# Patient Record
Sex: MALE | Race: WHITE
[De-identification: names, ages, dates, MRNs, and addresses within clinical notes are randomized per-mention and may not be internally consistent; named-entity substitution may affect disease eponyms.]

---

## 2018-03-12 ENCOUNTER — HOSPITAL ENCOUNTER (OUTPATIENT)
Dept: HOSPITAL 11 - JP.SDS | Age: 38
Discharge: HOME | End: 2018-03-12
Attending: SURGERY
Payer: COMMERCIAL

## 2018-03-12 DIAGNOSIS — E78.5: ICD-10-CM

## 2018-03-12 DIAGNOSIS — L90.5: Primary | ICD-10-CM

## 2018-03-12 DIAGNOSIS — K21.9: ICD-10-CM

## 2018-03-12 DIAGNOSIS — I10: ICD-10-CM

## 2018-03-12 DIAGNOSIS — E11.9: ICD-10-CM

## 2018-03-12 PROCEDURE — 88305 TISSUE EXAM BY PATHOLOGIST: CPT

## 2018-03-12 PROCEDURE — 22903 EXC ABD LES SC 3 CM/>: CPT

## 2018-03-13 NOTE — OR
DATE OF PROCEDURE:  03/12/2018

 

PREOPERATIVE DIAGNOSIS:  3.5 cm abdominal wall mass, right side.

 

POSTOPERATIVE DIAGNOSIS:  3.5 cm abdominal wall mass, right side.

 

PROCEDURE:  Excision of 3.5 cm abdominal wall mass, right side, through a 7-cm 
long

elliptical incision.

 

SURGEON:  Phong Hughes MD

 

ANESTHESIA:  IV anesthesia with monitored anesthesia.

 

INDICATION:  This 37-year-old white male has a lesion on the right side of his 
abdomen.  He

said it began something like a pimple 4 years ago and it has slowly gotten 
worse.  He was

referred to have it excised.  It measures 3.5 cm in the longest dimension x 
about 3 cm in

the transverse dimension.  I counseled him for excision of this, including 
risks and

alternatives, and he gave his informed consent to proceed.

 

DESCRIPTION OF PROCEDURE:  After adequate IV anesthesia was obtained, the 
patient's abdomen

was prepped and draped in the usual sterile fashion.  Lidocaine 1% with 
epinephrine in a

50:50 mix with 0.5% Marcaine was infiltrated about the lesion.  The lesion was 
then excised

using an elliptical incision with a 7-cm long ellipse, went all the way to the 
fascia using

Bovie cautery and excised it.  The medial aspect was tagged with a stitch to 
la it.  The

incision was irrigated and suctioned dry.  The deep tissues were closed with 
interrupted 3-0

and 2-0 Vicryl suture, 4-0 Vicryl using a subcuticular stitch was placed to 
approximate the

skin.  Dermabond was applied.  He tolerated the procedure well and was brought 
to the

recovery room in a good condition.

 

 

 

 

Phong Hughes MD

DD:  03/12/2018 11:00:51

DT:  03/12/2018 14:05:37

Job #:  [id number]

MTDD

## 2021-09-20 NOTE — EDM.PDOC
ED HPI GENERAL MEDICAL PROBLEM





- General


Chief Complaint: Chest Pain


Stated Complaint: CHEST PAIN


Time Seen by Provider: 09/20/21 15:21


Source of Information: Reports: Patient, RN Notes Reviewed


History Limitations: Reports: No Limitations





- History of Present Illness


INITIAL COMMENTS - FREE TEXT/NARRATIVE: 





Joseluis presents today with complaints of chest pressure for the past 24 hours. He

states he was walking a trail in [facility] with his wife yesterday, felt 

sudden onset of chest pressure, had to stop walking, took a rest for a few 

minutes then resumed trail.  He states the chest pressure went away but then 

came back and will not go away.  He denies radiation of the chest pressure to 

anywhere else on his body. He denies fever, chills, nausea, vomiting, change in 

bowel/bladder or other concerns.  He states he has not taken cholesterol, asa or

diabetes medicine for about 5 years.  





Primary provider Ze Bull NP. 





Patient reports use of marijuana.  He denies use of tobacco, alcohol or any 

other illicit drugs. 





Significant family history of CAD, CHF, MI.


  ** Bilateral Chest


Pain Score (Numeric/FACES): 3





- Related Data


                                    Allergies











Allergy/AdvReac Type Severity Reaction Status Date / Time


 


No Known Allergies Allergy   Verified 09/20/21 14:54











Home Meds: 


                                    Home Meds





Lisinopril 20 mg PO DAILY 03/08/18 [History]











Past Medical History


HEENT History: Reports: Impaired Vision


Cardiovascular History: Reports: Heart Murmur, Hypertension


Respiratory History: Reports: Sleep Apnea


Psychiatric History: Reports: Anxiety


Endocrine/Metabolic History: Reports: Diabetes, Type II, Obesity/BMI 30+





- Infectious Disease History


Infectious Disease History: Reports: Chicken Pox





- Past Surgical History


Head Surgeries/Procedures: Reports: None


HEENT Surgical History: Reports: None


Cardiovascular Surgical History: Reports: None


Respiratory Surgical History: Reports: None


Endocrine Surgical History: Reports: None


Dermatological Surgical History: Reports: None





Social & Family History





- Family History


Family Medical History: No Pertinent Family History





- Tobacco Use


Tobacco Use Status *Q: Never Tobacco User





- Caffeine Use


Caffeine Use: Reports: Coffee





- Recreational Drug Use


Recreational Drug Use: No





ED ROS GENERAL





- Review of Systems


Review Of Systems: See Below


Constitutional: Reports: No Symptoms


HEENT: Reports: No Symptoms


Respiratory: Reports: No Symptoms


Cardiovascular: Reports: Chest Pain (chest pressure like something is sitting on

 chest since 1300 yesterday. ), Blood Pressure Problem.  Denies: Claudication, 

Dyspnea on Exertion, Edema, Lightheadedness, Orthopnea, Palpitations, PND, 

Syncope


Endocrine: Reports: Fatigue (for the past 24 hours)


GI/Abdominal: Reports: No Symptoms


: Reports: No Symptoms


Musculoskeletal: Reports: No Symptoms


Skin: Reports: No Symptoms


Neurological: Reports: No Symptoms


Psychiatric: Reports: No Symptoms


Hematologic/Lymphatic: Reports: No Symptoms


Immunologic: Reports: No Symptoms





ED EXAM, GENERAL





- Physical Exam


Exam: See Below


Exam Limited By: No Limitations


General Appearance: Alert, WD/WN, No Apparent Distress


Eye Exam: Bilateral Eye: Normal Inspection, PERRL


Ears: Normal External Exam, Normal Canal, Hearing Grossly Normal, Normal TMs


Throat/Mouth: Normal Inspection, Normal Lips, Normal Teeth, Normal Gums, Normal 

Oropharynx, Normal Voice, No Airway Compromise


Head: Atraumatic, Normocephalic


Neck: Normal Inspection, Supple, Non-Tender, Full Range of Motion.  No: 

Lymphadenopathy (R), Lymphadenopathy (L)


Respiratory/Chest: No Respiratory Distress, Lungs Clear, Normal Breath Sounds, 

No Accessory Muscle Use, Chest Non-Tender.  No: Decreased Breath Sounds, 

Crackles, Rales, Rhonchi, Wheezing, Stridor, Retractions, Splinting


Cardiovascular: Normal Peripheral Pulses, Regular Rate, Rhythm, No Edema, No 

Gallop, No Murmur, No Rub


Peripheral Pulses: 4+: Radial (L), Radial (R), Dorsalis Pedis (L), Dorsalis 

Pedis (R)


GI/Abdominal: Normal Bowel Sounds, Soft, Non-Tender, No Organomegaly, No 

Distention, No Abnormal Bruit, No Mass, Pelvis Stable.  No: Guarding, Rigid, 

Rebound, Tender


 (Male) Exam: Deferred


Rectal (Males) Exam: Deferred


Back Exam: Normal Inspection, Full Range of Motion.  No: CVA Tenderness (R), CVA

 Tenderness (L)


Extremities: Normal Inspection, Normal Range of Motion, Non-Tender, No Pedal 

Edema, Normal Capillary Refill


Neurological: Alert, Oriented, CN II-XII Intact, Normal Cognition, Normal 

Reflexes, No Motor/Sensory Deficits


Psychiatric: Normal Affect, Normal Mood


Skin Exam: Warm, Dry, Intact, Normal Color, No Rash


Lymphatic: No Adenopathy


  ** #1 Interpretation


EKG Date: 09/20/21


Time: 15:24


Rhythm: NSR


Rate (Beats/Min): 75


Axis: Normal


P-Wave: Present (Flipped T waves in lead III)


QRS: Normal


ST-T: Normal


QT: Normal


WI/PQ Interval: 161


Comparison: NA - No Prior EKG





  ** #2 Interpretation


EKG Date: 09/20/21


Time: 19:58


Rhythm: NSR


Axis: Normal


P-Wave: Present


QRS: Normal


ST-T: Normal


QT: Normal


Comparison: No Change (flipped T waves to lead III)





Course





- Vital Signs


Last Recorded V/S: 


                                Last Vital Signs











Temp  36.2 C   09/20/21 14:52


 


Pulse  76   09/20/21 22:15


 


Resp  18   09/20/21 22:15


 


BP  153/86 H  09/20/21 22:15


 


Pulse Ox  96   09/20/21 22:15














- Orders/Labs/Meds


Orders: 


                               Active Orders 24 hr











 Category Date Time Status


 


 Telemetry Monitoring [Cardiac Monitoring] [RC] .As Care  09/20/21 19:03 Active





 Directed   


 


 Chest 1V Frontal [CR] Stat Exams  09/20/21 15:39 Taken


 


 Heparin Sodium/D5W [Heparin 25,000 Units in D5W 500 ML] Med  09/20/21 18:45 

Active





 25,000 units in 500 ml IV TITRATE   


 


 Sodium Chloride 0.9% [Normal Saline] 1,000 ml Med  09/20/21 16:30 Active





 IV ASDIRECTED   


 


 Sodium Chloride 0.9% [Saline Flush] Med  09/20/21 15:40 Active





 10 ml FLUSH ASDIRECTED PRN   


 


 Saline Lock Insert [OM.PC] Routine Oth  09/20/21 15:40 Ordered


 


 EKG 12 Lead [EK] Routine Ther  09/20/21 15:21 Ordered


 


 EKG 12 Lead [EK] Routine Ther  09/20/21 19:04 Ordered








                                Medication Orders





Sodium Chloride (Normal Saline)  1,000 mls @ 50 mls/hr IV ASDIRECTED MARCELINO


   Last Admin: 09/20/21 16:41  Dose: 50 mls/hr


   Documented by: [id number]


Heparin Sodium/Dextrose (Heparin 25,000 Units In D5w 500 Ml)  25,000 units in 

500 mls @ 29.538 mls/hr IV TITRATE MARCELINO; Protocol


   Last Admin: 09/20/21 19:39  Dose: 8.13 units/kg/hr, 20 mls/hr


   Documented by: [email or web address]   Cosigned by: DANAY


Sodium Chloride (Sodium Chloride 0.9% 10 Ml Syringe)  10 ml FLUSH ASDIRECTED PRN


   PRN Reason: Keep Vein Open


   Last Admin: 09/20/21 16:39  Dose: 10 ml


   Documented by: [id number]








Labs: 


                                Laboratory Tests











  09/20/21 09/20/21 09/20/21 Range/Units





  15:52 15:52 16:34 


 


WBC  10.9    (4.5-11.0)  K/uL


 


RBC  4.70    (4.30-5.90)  M/uL


 


Hgb  14.4    (12.0-15.0)  g/dL


 


Hct  41.1    (40.0-54.0)  %


 


MCV  87    (80-98)  fL


 


MCH  31    (27-31)  pg


 


MCHC  35    (32-36)  %


 


Plt Count  227    (150-400)  K/uL


 


Neut % (Auto)  64.3    (36-66)  %


 


Lymph % (Auto)  28.1    (24-44)  %


 


Mono % (Auto)  6.4 H    (2-6)  %


 


Eos % (Auto)  0.7 L    (2-4)  %


 


Baso % (Auto)  0.5    (0-1)  %


 


Sodium   135 L   (140-148)  mmol/L


 


Potassium   3.7   (3.6-5.2)  mmol/L


 


Chloride   101   (100-108)  mmol/L


 


Carbon Dioxide   26   (21-32)  mmol/L


 


Anion Gap   11.7   (5.0-14.0)  mmol/L


 


BUN   12   (7-18)  mg/dL


 


Creatinine   1.0   (0.8-1.3)  mg/dL


 


Est Cr Clr Drug Dosing   105.62   mL/min


 


Estimated GFR (MDRD)   > 60   (>60)  


 


Glucose   163 H   ()  mg/dL


 


Calcium   8.7   (8.5-10.1)  mg/dL


 


Total Bilirubin   0.3   (0.2-1.0)  mg/dL


 


AST   14 L   (15-37)  U/L


 


ALT   24   (12-78)  U/L


 


Alkaline Phosphatase   59   ()  U/L


 


Troponin I   0.224 H*   (0.000-0.056)  ng/mL


 


NT-Pro-B Natriuret Pep    153 H  (5-125)  pg/mL


 


Total Protein   6.4   (6.4-8.2)  g/dL


 


Albumin   3.5   (3.4-5.0)  g/dL


 


Globulin   2.9   (2.3-3.5)  g/dL


 


Albumin/Globulin Ratio   1.2   (1.2-2.2)  


 


TSH, Ultra Sensitive   4.310 H   (0.358-3.740)  uIU/mL


 


Urine Color     (YELLOW)  


 


Urine Appearance     (CLEAR)  


 


Urine pH     (5.0-8.0)  


 


Ur Specific Gravity     (1.008-1.030)  


 


Urine Protein     (NEGATIVE)  mg/dL


 


Urine Glucose (UA)     (NEGATIVE)  mg/dL


 


Urine Ketones     (NEGATIVE)  mg/dL


 


Urine Occult Blood     (NEGATIVE)  


 


Urine Nitrite     (NEGATIVE)  


 


Urine Bilirubin     (NEGATIVE)  


 


Urine Urobilinogen     (0.2-1.0)  EU/dL


 


Ur Leukocyte Esterase     (NEGATIVE)  


 


Urine RBC     (0-5)  


 


Urine WBC     (0-5)  


 


Ur Epithelial Cells     


 


Amorphous Sediment     


 


Urine Bacteria     


 


Urine Mucus     


 


SARS CoV-2 RNA Rapid CYNDI     














  09/20/21 09/20/21 09/20/21 Range/Units





  16:36 17:20 17:52 


 


WBC     (4.5-11.0)  K/uL


 


RBC     (4.30-5.90)  M/uL


 


Hgb     (12.0-15.0)  g/dL


 


Hct     (40.0-54.0)  %


 


MCV     (80-98)  fL


 


MCH     (27-31)  pg


 


MCHC     (32-36)  %


 


Plt Count     (150-400)  K/uL


 


Neut % (Auto)     (36-66)  %


 


Lymph % (Auto)     (24-44)  %


 


Mono % (Auto)     (2-6)  %


 


Eos % (Auto)     (2-4)  %


 


Baso % (Auto)     (0-1)  %


 


Sodium     (140-148)  mmol/L


 


Potassium     (3.6-5.2)  mmol/L


 


Chloride     (100-108)  mmol/L


 


Carbon Dioxide     (21-32)  mmol/L


 


Anion Gap     (5.0-14.0)  mmol/L


 


BUN     (7-18)  mg/dL


 


Creatinine     (0.8-1.3)  mg/dL


 


Est Cr Clr Drug Dosing     mL/min


 


Estimated GFR (MDRD)     (>60)  


 


Glucose     ()  mg/dL


 


Calcium     (8.5-10.1)  mg/dL


 


Total Bilirubin     (0.2-1.0)  mg/dL


 


AST     (15-37)  U/L


 


ALT     (12-78)  U/L


 


Alkaline Phosphatase     ()  U/L


 


Troponin I    0.814 H*  (0.000-0.056)  ng/mL


 


NT-Pro-B Natriuret Pep     (5-125)  pg/mL


 


Total Protein     (6.4-8.2)  g/dL


 


Albumin     (3.4-5.0)  g/dL


 


Globulin     (2.3-3.5)  g/dL


 


Albumin/Globulin Ratio     (1.2-2.2)  


 


TSH, Ultra Sensitive     (0.358-3.740)  uIU/mL


 


Urine Color  Yellow    (YELLOW)  


 


Urine Appearance  Clear    (CLEAR)  


 


Urine pH  6.0    (5.0-8.0)  


 


Ur Specific Gravity  > 1.030    (1.008-1.030)  


 


Urine Protein  >=300 H    (NEGATIVE)  mg/dL


 


Urine Glucose (UA)  100 H    (NEGATIVE)  mg/dL


 


Urine Ketones  Negative    (NEGATIVE)  mg/dL


 


Urine Occult Blood  Negative    (NEGATIVE)  


 


Urine Nitrite  Negative    (NEGATIVE)  


 


Urine Bilirubin  Negative    (NEGATIVE)  


 


Urine Urobilinogen  0.2    (0.2-1.0)  EU/dL


 


Ur Leukocyte Esterase  Negative    (NEGATIVE)  


 


Urine RBC  0-5    (0-5)  


 


Urine WBC  Not seen    (0-5)  


 


Ur Epithelial Cells  Not seen    


 


Amorphous Sediment  Not seen    


 


Urine Bacteria  Rare    


 


Urine Mucus  Not seen    


 


SARS CoV-2 RNA Rapid CYNDI   Negative   














  09/20/21 Range/Units





  21:55 


 


WBC   (4.5-11.0)  K/uL


 


RBC   (4.30-5.90)  M/uL


 


Hgb   (12.0-15.0)  g/dL


 


Hct   (40.0-54.0)  %


 


MCV   (80-98)  fL


 


MCH   (27-31)  pg


 


MCHC   (32-36)  %


 


Plt Count   (150-400)  K/uL


 


Neut % (Auto)   (36-66)  %


 


Lymph % (Auto)   (24-44)  %


 


Mono % (Auto)   (2-6)  %


 


Eos % (Auto)   (2-4)  %


 


Baso % (Auto)   (0-1)  %


 


Sodium   (140-148)  mmol/L


 


Potassium   (3.6-5.2)  mmol/L


 


Chloride   (100-108)  mmol/L


 


Carbon Dioxide   (21-32)  mmol/L


 


Anion Gap   (5.0-14.0)  mmol/L


 


BUN   (7-18)  mg/dL


 


Creatinine   (0.8-1.3)  mg/dL


 


Est Cr Clr Drug Dosing   mL/min


 


Estimated GFR (MDRD)   (>60)  


 


Glucose   ()  mg/dL


 


Calcium   (8.5-10.1)  mg/dL


 


Total Bilirubin   (0.2-1.0)  mg/dL


 


AST   (15-37)  U/L


 


ALT   (12-78)  U/L


 


Alkaline Phosphatase   ()  U/L


 


Troponin I  1.523 H*  (0.000-0.056)  ng/mL


 


NT-Pro-B Natriuret Pep   (5-125)  pg/mL


 


Total Protein   (6.4-8.2)  g/dL


 


Albumin   (3.4-5.0)  g/dL


 


Globulin   (2.3-3.5)  g/dL


 


Albumin/Globulin Ratio   (1.2-2.2)  


 


TSH, Ultra Sensitive   (0.358-3.740)  uIU/mL


 


Urine Color   (YELLOW)  


 


Urine Appearance   (CLEAR)  


 


Urine pH   (5.0-8.0)  


 


Ur Specific Gravity   (1.008-1.030)  


 


Urine Protein   (NEGATIVE)  mg/dL


 


Urine Glucose (UA)   (NEGATIVE)  mg/dL


 


Urine Ketones   (NEGATIVE)  mg/dL


 


Urine Occult Blood   (NEGATIVE)  


 


Urine Nitrite   (NEGATIVE)  


 


Urine Bilirubin   (NEGATIVE)  


 


Urine Urobilinogen   (0.2-1.0)  EU/dL


 


Ur Leukocyte Esterase   (NEGATIVE)  


 


Urine RBC   (0-5)  


 


Urine WBC   (0-5)  


 


Ur Epithelial Cells   


 


Amorphous Sediment   


 


Urine Bacteria   


 


Urine Mucus   


 


SARS CoV-2 RNA Rapid CYNDI   








Initial troponin elevated.


Attempts at transfer for NSTEMI unsuccessful. 


Second troponin elevated.


[facility] contacted, house supervisor advises they have a bed available, 

hospitalist/cardiology contacted. 


[facility] contacted for transfer. 


Meds: 


Medications











Generic Name Dose Route Start Last Admin





  Trade Name Freq  PRN Reason Stop Dose Admin


 


Sodium Chloride  1,000 mls @ 50 mls/hr  09/20/21 16:30  09/20/21 16:41





  Normal Saline  IV   50 mls/hr





  ASDIRECTED MARCELINO   Administration


 


Heparin Sodium/Dextrose  25,000 units in 500 mls @ 29.538 mls/hr  09/20/21 18:45

  09/20/21 19:39





  Heparin 25,000 Units In D5w 500 Ml  IV   8.13 units/kg/hr





  TITRATE MARCELINO   20 mls/hr





    Administration





  Protocol  





  12 UNITS/KG/HR  


 


Sodium Chloride  10 ml  09/20/21 15:40  09/20/21 16:39





  Sodium Chloride 0.9% 10 Ml Syringe  FLUSH   10 ml





  ASDIRECTED PRN   Administration





  Keep Vein Open  














Discontinued Medications














Generic Name Dose Route Start Last Admin





  Trade Name Romulo  PRN Reason Stop Dose Admin


 


Aspirin  324 mg  09/20/21 15:38  09/20/21 15:43





  Aspirin 81 Mg Tab.Chew  PO  09/20/21 15:39  324 mg





  ONETIME ONE   Administration


 


Clopidogrel Bisulfate  600 mg  09/20/21 20:48  09/20/21 20:57





  Clopidogrel 75 Mg Tab  PO  09/20/21 20:49  600 mg





  ONETIME ONE   Administration


 


Heparin Sodium (Porcine)  4,000 units  09/20/21 18:43  09/20/21 19:36





  Heparin Sodium 5,000 Units/Ml Vial  IVPUSH  09/20/21 18:44  4,000 units





  ONETIME ONE   Administration


 


Metoprolol Tartrate 5 mg/  55 mls @ 100 mls/hr  09/20/21 17:59  09/20/21 18:03





  Sodium Chloride  IV  09/20/21 18:31  Not Given





  ONETIME ONE  


 


Lorazepam  0.5 mg  09/20/21 20:03  09/20/21 20:56





  Lorazepam 2 Mg/Ml Sdv  IVPUSH  09/20/21 20:04  0.5 mg





  ONETIME ONE   Administration


 


Lorazepam  0.5 mg  09/20/21 22:12 





  Lorazepam 2 Mg/Ml Sdv  IVPUSH  09/20/21 22:13 





  ONETIME ONE  


 


Metoprolol Tartrate  5 mg  09/20/21 16:41  09/20/21 16:56





  Metoprolol Tartrate 5 Mg/5 Ml Sdv  IVPUSH  09/20/21 16:42  5 mg





  ONETIME ONE   Administration


 


Metoprolol Tartrate  5 mg  09/20/21 18:02  09/20/21 18:08





  Metoprolol Tartrate 5 Mg/5 Ml Sdv  IVPUSH  09/20/21 18:03  5 mg





  ONETIME ONE   Administration


 


Morphine Sulfate  2 mg  09/20/21 16:42 





  Morphine 2 Mg/Ml Syringe  IVPUSH  09/20/21 16:43 





  ONETIME ONE  


 


Nitroglycerin  0.4 mg  09/20/21 16:28  09/20/21 16:33





  Nitroglycerin 0.4 Mg Tab.Sl  SL  09/20/21 16:29  0.4 mg





  ONETIME ONE   Administration


 


Nitroglycerin  0.4 mg  09/20/21 16:42 





  Nitroglycerin 0.4 Mg Tab.Sl  SL  09/20/21 16:43 





  ONETIME ONE  








1645 patient pain free after nitro SL x 1.


 





- Radiology Interpretation


Free Text/Narrative:: 





Chest x-ray reviewed, noted cardiomegaly. 





- Re-Assessments/Exams


Free Text/Narrative Re-Assessment/Exam: 





09/20/21 16:22


Patient and his wife notified of patient lab work.


NSTEMI





Patient in need of transfer and higher level of care.


Contacted:


[facility], [place] No beds available, unable to transfer


[facility], [place] No beds available, unable to transfer


[place], [place] No beds available, unable to transfer 


[place], [place] No beds available, unable to transfer


[place], [place] No beds available, unable to transfer


[place], [place] No beds available, unable to transfer 


[place], [place] No beds available, unable to transfer 


[facility] No beds available, unable to transfer 


[facility] No beds available, unable to transfer 


[place], [place] No beds available, unable to transfer 


[facility] No beds available, unable to transfer


[facility] No beds available, unable to transfer 


[facility] No beds available, unable to transfer


[place], [place] No beds available, unable to transfer


Critical Care coordinator - advised that their requests for critical care beds 

was capped at this time and to telephone [facility] for assistance. 





Patient advised, we will continue to monitor patient in emergency room.


Dr. Bustamante notified, he is in agreement with plan. 


Patient and his wife in agreement with plan. 





09/20/21 19:25


[facility] supervisor telephoned back, they have a med surg/telemetry bed open.


Dr. Carpenter can be contacted for report.





Dr. Carpenter contacted, I will telephone her back at [phone] per her 

request.





09/20/21 19:36


We will fax Emergency room records for possible transfer of patient. 


09/20/21 20:08


Dr. Carpenter contacted, she is in process of reviewing patient information.


She will telephone back. 


Patient and his wife notified of progress in transfer, they are in agreement 

with plan. 


[facility] cancelled due to need of different transfer arrangements.


Dr. Middleton updated on plan, he agrees with plan. 





09/20/21 20:45


Case Discussed with Dr. De Leon [facility] Cardiology service.


She accepts patient for transfer. She advised administration of plavix, repeat 

troponin, continue to monitor. Transport via ground.


Currently on bed hold.


Patient and his wife notified, they are in agreement with plan.


We will administer plavix 600mg PO x one dose, contiue heparin, nitro if needed,

 morphine for pain if needed. 


Patient currently denies chest pressure.  


He does complain of anxiety - we ill administer lorazepam 0.5mg IV. 





09/20/21 21:43


[facility] telephoned back, hospital admission states we can send the patient now. 

Laura IRVIN notified. 


Patient and his wife notified. 


EMS notified.





09/20/21 22:11


Patient care assumed by Dr. Middleton in [facility] ER, patient and wife 

notified. 


Pending EMS transfer, vital signs stable. Patient denies chest pain or pressure 

at this time. 


I will order additional lorazepam 0.5mg IV for anxiety if needed PRN. 








09/20/21 22:28


[facility] EMS service telephoned emergency room, they state they are 

working on arranging a transfer with their staff. 








09/20/21 22:50


3rd Troponin elevated at 1.523


No transport arranged per [facility] EMS at this time with >120 minutes 

since notification of need for ground transportation. 


[facility] advised, we will fly patient instead of ground due to ongoing 

elevation of troponin.


[facility] contacted, fixed wing transport arranged. 





Departure





- Departure


Time of Disposition: 20:45


Disposition: DC/Tfer to Acute Hospital [facility]


Reason for Transfer *Q: Other (need of cardiology services angiogram, possible 

stent placement or other intervention, increased troponin, angina, cardiomegaly,

 hypertension, NSTEMI)


Condition: Fair


Clinical Impression: 


 NSTEMI (non-ST elevation myocardial infarction), Elevated troponin, 

Hypertension, Uncontrolled diabetes mellitus, Cardiomegaly, Hypokalemia, Angina 

at rest





Referrals: 


PCP,None [Primary Care Provider] - 


Forms:  ED Department Discharge





Sepsis Event Note (ED)





- Evaluation


Sepsis Screening Result: No Definite Risk





- Focused Exam


Vital Signs: 


                                   Vital Signs











  Temp Pulse Pulse Resp BP BP Pulse Ox


 


 09/20/21 22:15    76  18   153/86 H  96


 


 09/20/21 21:15    68  20   145/79 H  97


 


 09/20/21 20:15    78  11 L   158/88 H  96


 


 09/20/21 19:15    74  19   147/78 H  96


 


 09/20/21 18:30    69  11 L   143/85 H  96


 


 09/20/21 18:14    67  12   144/94 H  93 L


 


 09/20/21 18:08   69    158/90 H  


 


 09/20/21 17:52    73  14   158/90 H  98


 


 09/20/21 17:33    69  16   168/99 H  97


 


 09/20/21 17:16    67    157/102 H  96


 


 09/20/21 17:03    69    164/97 H 


 


 09/20/21 17:02    69  14   180/99 H  98


 


 09/20/21 16:57    73  14   168/98 H  96


 


 09/20/21 16:56   77    168/98 H  


 


 09/20/21 16:45    72  16   160/97 H  96


 


 09/20/21 16:36    79    168/105 H 


 


 09/20/21 16:33      172/99 H  


 


 09/20/21 16:28    80  15   168/105 H  96


 


 09/20/21 15:47    78  15   147/78 H  96


 


 09/20/21 15:45    77    172/99 H 


 


 09/20/21 14:52  36.2 C   82  16   166/97 H  97














- My Orders


Last 24 Hours: 


My Active Orders





09/20/21 15:21


EKG 12 Lead [EK] Routine 





09/20/21 15:39


Chest 1V Frontal [CR] Stat 





09/20/21 15:40


Sodium Chloride 0.9% [Saline Flush]   10 ml FLUSH ASDIRECTED PRN 


Saline Lock Insert [OM.PC] Routine 





09/20/21 16:30


Sodium Chloride 0.9% [Normal Saline] 1,000 ml IV ASDIRECTED 





09/20/21 18:45


Heparin Sodium/D5W [Heparin 25,000 Units in D5W 500 ML] 25,000 units in 500 ml 

IV TITRATE 





09/20/21 19:03


Telemetry Monitoring [Cardiac Monitoring] [RC] .As Directed 





09/20/21 19:04


EKG 12 Lead [EK] Routine 














- Assessment/Plan


Last 24 Hours: 


My Active Orders





09/20/21 15:21


EKG 12 Lead [EK] Routine 





09/20/21 15:39


Chest 1V Frontal [CR] Stat 





09/20/21 15:40


Sodium Chloride 0.9% [Saline Flush]   10 ml FLUSH ASDIRECTED PRN 


Saline Lock Insert [OM.PC] Routine 





09/20/21 16:30


Sodium Chloride 0.9% [Normal Saline] 1,000 ml IV ASDIRECTED 





09/20/21 18:45


Heparin Sodium/D5W [Heparin 25,000 Units in D5W 500 ML] 25,000 units in 500 ml 

IV TITRATE 





09/20/21 19:03


Telemetry Monitoring [Cardiac Monitoring] [RC] .As Directed 





09/20/21 19:04


EKG 12 Lead [EK] Routine 











Assessment:: 





 NSTEMI (non-ST elevation myocardial infarction), Elevated troponin, 

Hypertension, Uncontrolled diabetes mellitus, Cardiomegaly, Hypokalemia, angina 

at rest





Patient transfer to [facility] for ongoing care, cardiology services, 

possible angio, further testing, stent placement/surgical intervention to be 

determined. 


Plan: 





Patient transfer to [facility], acceptance per Dr. De Leon, cardiology.


Transport via EMS ground.


IV x 2, heparin drip, NS at TKO, Nitro PRN - currently chest pain/pressure free.


ASA 324mg PO, plavix 600mg PO, heparin IV bolus, Metoprolol 5mg IV x 2 doses 

administered.

## 2021-09-21 NOTE — CR
CHEST: Portable 9/20/2021 at 4:17 PM

 

CLINICAL HISTORY:Chest pressure

 

COMPARISON:None

 

FINDINGS:  The heart size is enlarged. The pulmonary vascularity and hilar

structures are normal. No infiltrate effusion or pneumothorax is seen.

 

IMPRESSION: No acute cardiopulmonary process.

 

Cardiomegaly